# Patient Record
Sex: MALE | Race: WHITE | NOT HISPANIC OR LATINO
[De-identification: names, ages, dates, MRNs, and addresses within clinical notes are randomized per-mention and may not be internally consistent; named-entity substitution may affect disease eponyms.]

---

## 2016-07-22 RX ORDER — POTASSIUM CHLORIDE 20 MEQ
1 PACKET (EA) ORAL
Qty: 0 | Refills: 0 | COMMUNITY
Start: 2016-07-22

## 2016-07-22 RX ORDER — ASPIRIN/CALCIUM CARB/MAGNESIUM 324 MG
1 TABLET ORAL
Qty: 0 | Refills: 0 | COMMUNITY
Start: 2016-07-22

## 2016-07-22 RX ORDER — LOSARTAN POTASSIUM 100 MG/1
1 TABLET, FILM COATED ORAL
Qty: 0 | Refills: 0 | COMMUNITY
Start: 2016-07-22

## 2017-01-10 ENCOUNTER — OTHER (OUTPATIENT)
Age: 82
End: 2017-01-10

## 2017-02-13 ENCOUNTER — FORM ENCOUNTER (OUTPATIENT)
Age: 82
End: 2017-02-13

## 2017-02-14 ENCOUNTER — OUTPATIENT (OUTPATIENT)
Dept: OUTPATIENT SERVICES | Facility: HOSPITAL | Age: 82
LOS: 1 days | End: 2017-02-14
Payer: MEDICARE

## 2017-02-14 ENCOUNTER — APPOINTMENT (OUTPATIENT)
Dept: CT IMAGING | Facility: CLINIC | Age: 82
End: 2017-02-14

## 2017-02-14 DIAGNOSIS — K08.409 PARTIAL LOSS OF TEETH, UNSPECIFIED CAUSE, UNSPECIFIED CLASS: Chronic | ICD-10-CM

## 2017-02-14 DIAGNOSIS — Z98.89 OTHER SPECIFIED POSTPROCEDURAL STATES: Chronic | ICD-10-CM

## 2017-02-14 DIAGNOSIS — Z95.5 PRESENCE OF CORONARY ANGIOPLASTY IMPLANT AND GRAFT: Chronic | ICD-10-CM

## 2017-02-14 DIAGNOSIS — Z00.8 ENCOUNTER FOR OTHER GENERAL EXAMINATION: ICD-10-CM

## 2017-02-14 DIAGNOSIS — C64.9 MALIGNANT NEOPLASM OF UNSPECIFIED KIDNEY, EXCEPT RENAL PELVIS: ICD-10-CM

## 2017-02-14 PROCEDURE — 74170 CT ABD WO CNTRST FLWD CNTRST: CPT

## 2017-02-17 ENCOUNTER — APPOINTMENT (OUTPATIENT)
Dept: UROLOGY | Facility: CLINIC | Age: 82
End: 2017-02-17

## 2017-02-17 VITALS
BODY MASS INDEX: 24.13 KG/M2 | WEIGHT: 188 LBS | SYSTOLIC BLOOD PRESSURE: 100 MMHG | HEIGHT: 74 IN | HEART RATE: 81 BPM | DIASTOLIC BLOOD PRESSURE: 58 MMHG

## 2017-03-06 ENCOUNTER — OUTPATIENT (OUTPATIENT)
Dept: OUTPATIENT SERVICES | Facility: HOSPITAL | Age: 82
LOS: 1 days | Discharge: HOME | End: 2017-03-06
Payer: MEDICARE

## 2017-03-06 DIAGNOSIS — C64.9 MALIGNANT NEOPLASM OF UNSPECIFIED KIDNEY, EXCEPT RENAL PELVIS: ICD-10-CM

## 2017-03-06 DIAGNOSIS — K08.409 PARTIAL LOSS OF TEETH, UNSPECIFIED CAUSE, UNSPECIFIED CLASS: Chronic | ICD-10-CM

## 2017-03-06 DIAGNOSIS — Z98.89 OTHER SPECIFIED POSTPROCEDURAL STATES: Chronic | ICD-10-CM

## 2017-03-06 DIAGNOSIS — Z95.5 PRESENCE OF CORONARY ANGIOPLASTY IMPLANT AND GRAFT: Chronic | ICD-10-CM

## 2017-03-08 ENCOUNTER — APPOINTMENT (OUTPATIENT)
Dept: HEMATOLOGY ONCOLOGY | Facility: CLINIC | Age: 82
End: 2017-03-08

## 2017-03-08 VITALS
HEIGHT: 73.23 IN | RESPIRATION RATE: 16 BRPM | OXYGEN SATURATION: 99 % | DIASTOLIC BLOOD PRESSURE: 70 MMHG | SYSTOLIC BLOOD PRESSURE: 112 MMHG | TEMPERATURE: 96.8 F | BODY MASS INDEX: 23.99 KG/M2 | HEART RATE: 80 BPM | WEIGHT: 182.98 LBS

## 2017-03-08 DIAGNOSIS — Z80.51 FAMILY HISTORY OF MALIGNANT NEOPLASM OF KIDNEY: ICD-10-CM

## 2017-03-08 DIAGNOSIS — Z85.820 PERSONAL HISTORY OF MALIGNANT MELANOMA OF SKIN: ICD-10-CM

## 2017-03-08 DIAGNOSIS — Z82.0 FAMILY HISTORY OF EPILEPSY AND OTHER DISEASES OF THE NERVOUS SYSTEM: ICD-10-CM

## 2017-03-08 DIAGNOSIS — Z85.118 PERSONAL HISTORY OF OTHER MALIGNANT NEOPLASM OF BRONCHUS AND LUNG: ICD-10-CM

## 2017-03-08 DIAGNOSIS — Z86.39 PERSONAL HISTORY OF OTHER ENDOCRINE, NUTRITIONAL AND METABOLIC DISEASE: ICD-10-CM

## 2017-03-08 DIAGNOSIS — Z87.891 PERSONAL HISTORY OF NICOTINE DEPENDENCE: ICD-10-CM

## 2017-03-08 DIAGNOSIS — H35.30 UNSPECIFIED MACULAR DEGENERATION: ICD-10-CM

## 2017-03-08 DIAGNOSIS — Z80.3 FAMILY HISTORY OF MALIGNANT NEOPLASM OF BREAST: ICD-10-CM

## 2017-03-08 DIAGNOSIS — Z80.8 FAMILY HISTORY OF MALIGNANT NEOPLASM OF OTHER ORGANS OR SYSTEMS: ICD-10-CM

## 2017-03-09 DIAGNOSIS — C34.90 MALIGNANT NEOPLASM OF UNSPECIFIED PART OF UNSPECIFIED BRONCHUS OR LUNG: ICD-10-CM

## 2017-03-09 DIAGNOSIS — C64.2 MALIGNANT NEOPLASM OF LEFT KIDNEY, EXCEPT RENAL PELVIS: ICD-10-CM

## 2017-03-19 ENCOUNTER — RESULT REVIEW (OUTPATIENT)
Age: 82
End: 2017-03-19

## 2017-03-20 PROCEDURE — 88321 CONSLTJ&REPRT SLD PREP ELSWR: CPT

## 2017-03-28 ENCOUNTER — FORM ENCOUNTER (OUTPATIENT)
Age: 82
End: 2017-03-28

## 2017-03-29 ENCOUNTER — OUTPATIENT (OUTPATIENT)
Dept: OUTPATIENT SERVICES | Facility: HOSPITAL | Age: 82
LOS: 1 days | End: 2017-03-29
Payer: MEDICARE

## 2017-03-29 ENCOUNTER — APPOINTMENT (OUTPATIENT)
Dept: RADIOLOGY | Facility: CLINIC | Age: 82
End: 2017-03-29

## 2017-03-29 DIAGNOSIS — Z98.89 OTHER SPECIFIED POSTPROCEDURAL STATES: Chronic | ICD-10-CM

## 2017-03-29 DIAGNOSIS — J90 PLEURAL EFFUSION, NOT ELSEWHERE CLASSIFIED: ICD-10-CM

## 2017-03-29 DIAGNOSIS — Z95.5 PRESENCE OF CORONARY ANGIOPLASTY IMPLANT AND GRAFT: Chronic | ICD-10-CM

## 2017-03-29 DIAGNOSIS — K08.409 PARTIAL LOSS OF TEETH, UNSPECIFIED CAUSE, UNSPECIFIED CLASS: Chronic | ICD-10-CM

## 2017-03-29 PROCEDURE — 71046 X-RAY EXAM CHEST 2 VIEWS: CPT

## 2017-03-29 PROCEDURE — 71020: CPT | Mod: 26

## 2017-04-03 ENCOUNTER — APPOINTMENT (OUTPATIENT)
Dept: PULMONOLOGY | Facility: CLINIC | Age: 82
End: 2017-04-03

## 2017-04-03 VITALS
WEIGHT: 185 LBS | SYSTOLIC BLOOD PRESSURE: 130 MMHG | DIASTOLIC BLOOD PRESSURE: 64 MMHG | HEART RATE: 78 BPM | BODY MASS INDEX: 24.26 KG/M2 | OXYGEN SATURATION: 97 %

## 2017-04-03 DIAGNOSIS — C64.9 MALIGNANT NEOPLASM OF UNSPECIFIED KIDNEY, EXCEPT RENAL PELVIS: ICD-10-CM

## 2017-04-03 DIAGNOSIS — Z85.528 PERSONAL HISTORY OF OTHER MALIGNANT NEOPLASM OF KIDNEY: ICD-10-CM

## 2017-04-03 DIAGNOSIS — J98.4 OTHER DISORDERS OF LUNG: ICD-10-CM

## 2017-04-06 ENCOUNTER — APPOINTMENT (OUTPATIENT)
Dept: DERMATOLOGY | Facility: CLINIC | Age: 82
End: 2017-04-06

## 2017-04-13 ENCOUNTER — APPOINTMENT (OUTPATIENT)
Dept: THORACIC SURGERY | Facility: CLINIC | Age: 82
End: 2017-04-13

## 2017-04-13 ENCOUNTER — OUTPATIENT (OUTPATIENT)
Dept: OUTPATIENT SERVICES | Facility: HOSPITAL | Age: 82
LOS: 1 days | End: 2017-04-13
Payer: MEDICARE

## 2017-04-13 VITALS
RESPIRATION RATE: 16 BRPM | SYSTOLIC BLOOD PRESSURE: 119 MMHG | HEIGHT: 73 IN | DIASTOLIC BLOOD PRESSURE: 74 MMHG | HEART RATE: 96 BPM | OXYGEN SATURATION: 98 % | BODY MASS INDEX: 24.52 KG/M2 | WEIGHT: 185 LBS

## 2017-04-13 DIAGNOSIS — K08.409 PARTIAL LOSS OF TEETH, UNSPECIFIED CAUSE, UNSPECIFIED CLASS: Chronic | ICD-10-CM

## 2017-04-13 DIAGNOSIS — Z95.5 PRESENCE OF CORONARY ANGIOPLASTY IMPLANT AND GRAFT: Chronic | ICD-10-CM

## 2017-04-13 DIAGNOSIS — Z98.89 OTHER SPECIFIED POSTPROCEDURAL STATES: Chronic | ICD-10-CM

## 2017-04-13 DIAGNOSIS — J90 PLEURAL EFFUSION, NOT ELSEWHERE CLASSIFIED: ICD-10-CM

## 2017-04-13 PROCEDURE — 71046 X-RAY EXAM CHEST 2 VIEWS: CPT

## 2017-04-13 PROCEDURE — 71020: CPT | Mod: 26

## 2017-04-13 RX ORDER — ALBUTEROL SULFATE 90 UG/1
108 (90 BASE) AEROSOL, METERED RESPIRATORY (INHALATION)
Refills: 0 | Status: ACTIVE | COMMUNITY

## 2017-04-25 ENCOUNTER — OUTPATIENT (OUTPATIENT)
Dept: OUTPATIENT SERVICES | Facility: HOSPITAL | Age: 82
LOS: 1 days | End: 2017-04-25
Payer: MEDICARE

## 2017-04-25 VITALS
SYSTOLIC BLOOD PRESSURE: 119 MMHG | HEIGHT: 74 IN | DIASTOLIC BLOOD PRESSURE: 65 MMHG | HEART RATE: 72 BPM | TEMPERATURE: 98 F | RESPIRATION RATE: 18 BRPM | WEIGHT: 178.57 LBS

## 2017-04-25 DIAGNOSIS — Z98.89 OTHER SPECIFIED POSTPROCEDURAL STATES: Chronic | ICD-10-CM

## 2017-04-25 DIAGNOSIS — Z01.818 ENCOUNTER FOR OTHER PREPROCEDURAL EXAMINATION: ICD-10-CM

## 2017-04-25 DIAGNOSIS — J91.8 PLEURAL EFFUSION IN OTHER CONDITIONS CLASSIFIED ELSEWHERE: ICD-10-CM

## 2017-04-25 DIAGNOSIS — Z95.5 PRESENCE OF CORONARY ANGIOPLASTY IMPLANT AND GRAFT: Chronic | ICD-10-CM

## 2017-04-25 DIAGNOSIS — Z98.890 OTHER SPECIFIED POSTPROCEDURAL STATES: Chronic | ICD-10-CM

## 2017-04-25 DIAGNOSIS — K08.409 PARTIAL LOSS OF TEETH, UNSPECIFIED CAUSE, UNSPECIFIED CLASS: Chronic | ICD-10-CM

## 2017-04-25 DIAGNOSIS — Z98.41 CATARACT EXTRACTION STATUS, RIGHT EYE: Chronic | ICD-10-CM

## 2017-04-25 LAB
ANION GAP SERPL CALC-SCNC: 12 MMOL/L — SIGNIFICANT CHANGE UP (ref 5–17)
APTT BLD: 28.1 SEC — SIGNIFICANT CHANGE UP (ref 27.5–37.4)
BASOPHILS # BLD AUTO: 0 K/UL — SIGNIFICANT CHANGE UP (ref 0–0.2)
BASOPHILS NFR BLD AUTO: 0.4 % — SIGNIFICANT CHANGE UP (ref 0–2)
BUN SERPL-MCNC: 26 MG/DL — HIGH (ref 8–20)
CALCIUM SERPL-MCNC: 9.6 MG/DL — SIGNIFICANT CHANGE UP (ref 8.6–10.2)
CHLORIDE SERPL-SCNC: 99 MMOL/L — SIGNIFICANT CHANGE UP (ref 98–107)
CO2 SERPL-SCNC: 26 MMOL/L — SIGNIFICANT CHANGE UP (ref 22–29)
CREAT SERPL-MCNC: 1.03 MG/DL — SIGNIFICANT CHANGE UP (ref 0.5–1.3)
EOSINOPHIL # BLD AUTO: 0.2 K/UL — SIGNIFICANT CHANGE UP (ref 0–0.5)
EOSINOPHIL NFR BLD AUTO: 2.1 % — SIGNIFICANT CHANGE UP (ref 0–5)
GLUCOSE SERPL-MCNC: 147 MG/DL — HIGH (ref 70–115)
HCT VFR BLD CALC: 35.7 % — LOW (ref 42–52)
HGB BLD-MCNC: 11.5 G/DL — LOW (ref 14–18)
INR BLD: 1.04 RATIO — SIGNIFICANT CHANGE UP (ref 0.88–1.16)
LYMPHOCYTES # BLD AUTO: 0.3 K/UL — LOW (ref 1–4.8)
LYMPHOCYTES # BLD AUTO: 4.1 % — LOW (ref 20–55)
MCHC RBC-ENTMCNC: 30.3 PG — SIGNIFICANT CHANGE UP (ref 27–31)
MCHC RBC-ENTMCNC: 32.2 G/DL — SIGNIFICANT CHANGE UP (ref 32–36)
MCV RBC AUTO: 93.9 FL — SIGNIFICANT CHANGE UP (ref 80–94)
MONOCYTES # BLD AUTO: 0.8 K/UL — SIGNIFICANT CHANGE UP (ref 0–0.8)
MONOCYTES NFR BLD AUTO: 10.8 % — HIGH (ref 3–10)
NEUTROPHILS # BLD AUTO: 6.4 K/UL — SIGNIFICANT CHANGE UP (ref 1.8–8)
NEUTROPHILS NFR BLD AUTO: 82.3 % — HIGH (ref 37–73)
PLATELET # BLD AUTO: 204 K/UL — SIGNIFICANT CHANGE UP (ref 150–400)
POTASSIUM SERPL-MCNC: 4.3 MMOL/L — SIGNIFICANT CHANGE UP (ref 3.5–5.3)
POTASSIUM SERPL-SCNC: 4.3 MMOL/L — SIGNIFICANT CHANGE UP (ref 3.5–5.3)
PROTHROM AB SERPL-ACNC: 11.4 SEC — SIGNIFICANT CHANGE UP (ref 9.8–12.7)
RBC # BLD: 3.8 M/UL — LOW (ref 4.6–6.2)
RBC # FLD: 14.5 % — SIGNIFICANT CHANGE UP (ref 11–15.6)
SODIUM SERPL-SCNC: 137 MMOL/L — SIGNIFICANT CHANGE UP (ref 135–145)
WBC # BLD: 7.8 K/UL — SIGNIFICANT CHANGE UP (ref 4.8–10.8)
WBC # FLD AUTO: 7.8 K/UL — SIGNIFICANT CHANGE UP (ref 4.8–10.8)

## 2017-04-25 PROCEDURE — G0463: CPT

## 2017-04-25 PROCEDURE — 85027 COMPLETE CBC AUTOMATED: CPT

## 2017-04-25 PROCEDURE — 85610 PROTHROMBIN TIME: CPT

## 2017-04-25 PROCEDURE — 80048 BASIC METABOLIC PNL TOTAL CA: CPT

## 2017-04-25 PROCEDURE — 85730 THROMBOPLASTIN TIME PARTIAL: CPT

## 2017-04-25 RX ORDER — SODIUM CHLORIDE 9 MG/ML
3 INJECTION INTRAMUSCULAR; INTRAVENOUS; SUBCUTANEOUS ONCE
Qty: 0 | Refills: 0 | Status: DISCONTINUED | OUTPATIENT
Start: 2017-04-28 | End: 2017-05-13

## 2017-04-25 NOTE — H&P PST ADULT - FAMILY HISTORY
Mother  Still living? Unknown  Family history of kidney cancer, Age at diagnosis: Age Unknown     Sibling  Still living? Unknown  Family history of kidney cancer, Age at diagnosis: Age Unknown     Father  Still living? Unknown  Family history of coronary artery disease, Age at diagnosis: Age Unknown

## 2017-04-25 NOTE — H&P PST ADULT - PSH
H/O shoulder surgery    H/O tooth extraction    History of bilateral cataract extraction    History of knee surgery    History of thoracic surgery  VATS  Presence of stent in coronary artery  x 17  S/P skin cancer resection

## 2017-04-25 NOTE — ASU PATIENT PROFILE, ADULT - ABILITY TO HEAR (WITH HEARING AID OR HEARING APPLIANCE IF NORMALLY USED):
hearing aids both ears/Mildly to Moderately Impaired: difficulty hearing in some environments or speaker may need to increase volume or speak distinctly

## 2017-04-25 NOTE — H&P PST ADULT - PMH
BPH (benign prostatic hyperplasia)    COPD (chronic obstructive pulmonary disease)    DM (diabetes mellitus)    DVT (deep venous thrombosis)    HLD (hyperlipidemia)    HTN (hypertension)    Kidney carcinoma    Lung cancer    Macular degeneration    Melanoma    Multiple falls    Parotid tumor  Right  Pleural effusion    Squamous cell skin cancer    TIA (transient ischemic attack) Atrial fibrillation    BPH (benign prostatic hyperplasia)    COPD (chronic obstructive pulmonary disease)    DM (diabetes mellitus)    DVT (deep venous thrombosis)    HLD (hyperlipidemia)    HTN (hypertension)    Kidney carcinoma    Lung cancer    Macular degeneration    Melanoma    Multiple falls    Parotid tumor  Right  Pleural effusion    Squamous cell skin cancer    TIA (transient ischemic attack)

## 2017-04-25 NOTE — H&P PST ADULT - HISTORY OF PRESENT ILLNESS
85M with history of lung cancer, had prior Thoracentesis March 2016. Saw Dr. Mcelroy for routine follow up of chronic SOB April 3, 2017. Chest xray was concerning and a consult with Dr. Freitas was obtained. Currently scheduled for thoracenteisis. 85M with history of lung cancer, had prior Thoracentesis March 2016. Saw Dr. Mcelroy for routine follow up of chronic SOB April 3, 2017. Chest xray was concerning and a consult with Dr. Freitas was obtained. Currently scheduled for thoracentesis. Patient reports breathing comfortable at rest but with dyspnea on minimal exertion.

## 2017-04-25 NOTE — ASU PATIENT PROFILE, ADULT - PMH
BPH (benign prostatic hyperplasia)    COPD (chronic obstructive pulmonary disease)    DM (diabetes mellitus)    DVT (deep venous thrombosis)    HLD (hyperlipidemia)    HTN (hypertension)    Kidney carcinoma    Lung cancer    Macular degeneration    Melanoma    Multiple falls    Parotid tumor  Right  Pleural effusion    Squamous cell skin cancer    TIA (transient ischemic attack)

## 2017-04-25 NOTE — H&P PST ADULT - NSANTHOSAYNRD_GEN_A_CORE
No. DAYO screening performed.  STOP BANG Legend: 0-2 = LOW Risk; 3-4 = INTERMEDIATE Risk; 5-8 = HIGH Risk

## 2017-04-25 NOTE — H&P PST ADULT - ASSESSMENT
85M PMH HTN, CAD with stents, CHF, COPD, Pleural Effusion, Lung Cancer, Kidney Carcinoma, Melanoma, DM, Hyperlipidemia, DVT, TIA, BPH and Macular Degeneration with Left Pleural Effusion for Left Thoracentesis.

## 2017-04-26 DIAGNOSIS — Z01.818 ENCOUNTER FOR OTHER PREPROCEDURAL EXAMINATION: ICD-10-CM

## 2017-04-26 DIAGNOSIS — J91.8 PLEURAL EFFUSION IN OTHER CONDITIONS CLASSIFIED ELSEWHERE: ICD-10-CM

## 2017-04-28 ENCOUNTER — RESULT REVIEW (OUTPATIENT)
Age: 82
End: 2017-04-28

## 2017-04-28 ENCOUNTER — OUTPATIENT (OUTPATIENT)
Dept: OUTPATIENT SERVICES | Facility: HOSPITAL | Age: 82
LOS: 1 days | End: 2017-04-28
Payer: MEDICARE

## 2017-04-28 VITALS
WEIGHT: 179.9 LBS | HEART RATE: 84 BPM | DIASTOLIC BLOOD PRESSURE: 53 MMHG | SYSTOLIC BLOOD PRESSURE: 126 MMHG | OXYGEN SATURATION: 100 % | HEIGHT: 74 IN | RESPIRATION RATE: 16 BRPM | TEMPERATURE: 97 F

## 2017-04-28 VITALS
DIASTOLIC BLOOD PRESSURE: 58 MMHG | RESPIRATION RATE: 15 BRPM | HEART RATE: 71 BPM | SYSTOLIC BLOOD PRESSURE: 119 MMHG | OXYGEN SATURATION: 100 % | TEMPERATURE: 97 F

## 2017-04-28 DIAGNOSIS — Z98.890 OTHER SPECIFIED POSTPROCEDURAL STATES: Chronic | ICD-10-CM

## 2017-04-28 DIAGNOSIS — K08.409 PARTIAL LOSS OF TEETH, UNSPECIFIED CAUSE, UNSPECIFIED CLASS: Chronic | ICD-10-CM

## 2017-04-28 DIAGNOSIS — Z98.89 OTHER SPECIFIED POSTPROCEDURAL STATES: Chronic | ICD-10-CM

## 2017-04-28 DIAGNOSIS — J91.8 PLEURAL EFFUSION IN OTHER CONDITIONS CLASSIFIED ELSEWHERE: ICD-10-CM

## 2017-04-28 DIAGNOSIS — Z98.41 CATARACT EXTRACTION STATUS, RIGHT EYE: Chronic | ICD-10-CM

## 2017-04-28 DIAGNOSIS — Z95.5 PRESENCE OF CORONARY ANGIOPLASTY IMPLANT AND GRAFT: Chronic | ICD-10-CM

## 2017-04-28 LAB
ALBUMIN FLD-MCNC: 1.4 G/DL — SIGNIFICANT CHANGE UP
B PERT IGG+IGM PNL SER: ABNORMAL
COLOR FLD: SIGNIFICANT CHANGE UP
EOSINOPHIL # FLD: 1 % — SIGNIFICANT CHANGE UP
FLUID INTAKE SUBSTANCE CLASS: SIGNIFICANT CHANGE UP
FLUID SEGMENTED GRANULOCYTES: 8 % — SIGNIFICANT CHANGE UP
GLUCOSE FLD-MCNC: 103 MG/DL — SIGNIFICANT CHANGE UP
LDH SERPL L TO P-CCNC: 90 U/L — SIGNIFICANT CHANGE UP
LYMPHOCYTES # FLD: 77 % — SIGNIFICANT CHANGE UP
MESOTHL CELL # FLD: 7 % — SIGNIFICANT CHANGE UP
MONOS+MACROS # FLD: 7 % — SIGNIFICANT CHANGE UP
NIGHT BLUE STAIN TISS: SIGNIFICANT CHANGE UP
PH FLD: 8 — SIGNIFICANT CHANGE UP
PROT FLD-MCNC: 2.3 G/DL — SIGNIFICANT CHANGE UP
RCV VOL RI: HIGH /UL (ref 0–5)
SPECIMEN SOURCE FLD: SIGNIFICANT CHANGE UP
SPECIMEN SOURCE: SIGNIFICANT CHANGE UP
TOTAL NUCLEATED CELL COUNT, BODY FLUID: 728 /UL — HIGH (ref 0–5)
TUBE TYPE: SIGNIFICANT CHANGE UP

## 2017-04-28 PROCEDURE — 88112 CYTOPATH CELL ENHANCE TECH: CPT | Mod: 26

## 2017-04-28 PROCEDURE — 71010: CPT | Mod: 26

## 2017-04-28 PROCEDURE — 88305 TISSUE EXAM BY PATHOLOGIST: CPT | Mod: 26

## 2017-04-28 NOTE — PROGRESS NOTE ADULT - SUBJECTIVE AND OBJECTIVE BOX
POD 0 thoracentesis Left    ALLERGIES:  No Known Allergies      SUBJECTIVE:  Patient is a 85y Male   Seen and examined at bedside.  comfortable, had follow up CXR due small PTX,s/p thoracentesis.  No apparent distress, no respiratory distress  Nontoxic appearing.      PHYSICAL EXAM:    VITALS:  T(C): 36.2, Max: 36.2 (04-28 @ 07:06)  HR: 71 (66 - 85)  BP: 119/58 (110/56 - 126/68)  RR: 15 (14 - 18)  SpO2: 100% (99% - 100%)  Wt(kg): --    PE:  chest:  good air exchange      RADIOLOGY:  follow up CXR - no PTX noted     IMPRESSION:   s/p thoracentesis     PLAN:  [facility]  Discussed with Dr Bender

## 2017-05-03 LAB
CULTURE RESULTS: SIGNIFICANT CHANGE UP
SPECIMEN SOURCE: SIGNIFICANT CHANGE UP

## 2017-05-04 LAB — NON-GYN CYTOLOGY SPEC: SIGNIFICANT CHANGE UP

## 2017-05-08 ENCOUNTER — OUTPATIENT (OUTPATIENT)
Dept: OUTPATIENT SERVICES | Facility: HOSPITAL | Age: 82
LOS: 1 days | End: 2017-05-08
Payer: COMMERCIAL

## 2017-05-08 ENCOUNTER — APPOINTMENT (OUTPATIENT)
Dept: THORACIC SURGERY | Facility: CLINIC | Age: 82
End: 2017-05-08

## 2017-05-08 VITALS
SYSTOLIC BLOOD PRESSURE: 100 MMHG | BODY MASS INDEX: 24.52 KG/M2 | HEIGHT: 73 IN | HEART RATE: 71 BPM | RESPIRATION RATE: 16 BRPM | OXYGEN SATURATION: 99 % | DIASTOLIC BLOOD PRESSURE: 58 MMHG | WEIGHT: 185 LBS

## 2017-05-08 DIAGNOSIS — Z98.89 OTHER SPECIFIED POSTPROCEDURAL STATES: Chronic | ICD-10-CM

## 2017-05-08 DIAGNOSIS — K08.409 PARTIAL LOSS OF TEETH, UNSPECIFIED CAUSE, UNSPECIFIED CLASS: Chronic | ICD-10-CM

## 2017-05-08 DIAGNOSIS — Z98.890 OTHER SPECIFIED POSTPROCEDURAL STATES: Chronic | ICD-10-CM

## 2017-05-08 DIAGNOSIS — Z98.41 CATARACT EXTRACTION STATUS, RIGHT EYE: Chronic | ICD-10-CM

## 2017-05-08 DIAGNOSIS — Z95.5 PRESENCE OF CORONARY ANGIOPLASTY IMPLANT AND GRAFT: Chronic | ICD-10-CM

## 2017-05-08 DIAGNOSIS — J90 PLEURAL EFFUSION, NOT ELSEWHERE CLASSIFIED: ICD-10-CM

## 2017-05-08 PROCEDURE — C1769: CPT

## 2017-05-08 PROCEDURE — 87206 SMEAR FLUORESCENT/ACID STAI: CPT

## 2017-05-08 PROCEDURE — C1894: CPT

## 2017-05-08 PROCEDURE — 88112 CYTOPATH CELL ENHANCE TECH: CPT

## 2017-05-08 PROCEDURE — 76942 ECHO GUIDE FOR BIOPSY: CPT

## 2017-05-08 PROCEDURE — 83986 ASSAY PH BODY FLUID NOS: CPT

## 2017-05-08 PROCEDURE — 87205 SMEAR GRAM STAIN: CPT

## 2017-05-08 PROCEDURE — 87070 CULTURE OTHR SPECIMN AEROBIC: CPT

## 2017-05-08 PROCEDURE — 88305 TISSUE EXAM BY PATHOLOGIST: CPT

## 2017-05-08 PROCEDURE — 87102 FUNGUS ISOLATION CULTURE: CPT

## 2017-05-08 PROCEDURE — 87075 CULTR BACTERIA EXCEPT BLOOD: CPT

## 2017-05-08 PROCEDURE — 71020: CPT | Mod: 26

## 2017-05-08 PROCEDURE — 71046 X-RAY EXAM CHEST 2 VIEWS: CPT

## 2017-05-08 PROCEDURE — 71045 X-RAY EXAM CHEST 1 VIEW: CPT

## 2017-05-08 PROCEDURE — 89051 BODY FLUID CELL COUNT: CPT

## 2017-05-08 PROCEDURE — 87015 SPECIMEN INFECT AGNT CONCNTJ: CPT

## 2017-05-08 PROCEDURE — 32555 ASPIRATE PLEURA W/ IMAGING: CPT | Mod: LT

## 2017-05-08 PROCEDURE — 32555 ASPIRATE PLEURA W/ IMAGING: CPT

## 2017-05-08 PROCEDURE — 84157 ASSAY OF PROTEIN OTHER: CPT

## 2017-05-08 PROCEDURE — 87116 MYCOBACTERIA CULTURE: CPT

## 2017-05-08 PROCEDURE — 83615 LACTATE (LD) (LDH) ENZYME: CPT

## 2017-05-08 PROCEDURE — 82945 GLUCOSE OTHER FLUID: CPT

## 2017-05-08 PROCEDURE — 82042 OTHER SOURCE ALBUMIN QUAN EA: CPT

## 2017-05-11 ENCOUNTER — NON-APPOINTMENT (OUTPATIENT)
Age: 82
End: 2017-05-11

## 2017-05-11 ENCOUNTER — APPOINTMENT (OUTPATIENT)
Dept: CARDIOLOGY | Facility: CLINIC | Age: 82
End: 2017-05-11

## 2017-05-11 VITALS
HEIGHT: 73 IN | BODY MASS INDEX: 24.52 KG/M2 | SYSTOLIC BLOOD PRESSURE: 128 MMHG | DIASTOLIC BLOOD PRESSURE: 73 MMHG | OXYGEN SATURATION: 94 % | WEIGHT: 185 LBS | HEART RATE: 83 BPM

## 2017-05-11 VITALS — SYSTOLIC BLOOD PRESSURE: 125 MMHG | DIASTOLIC BLOOD PRESSURE: 73 MMHG

## 2017-05-11 DIAGNOSIS — I49.9 CARDIAC ARRHYTHMIA, UNSPECIFIED: ICD-10-CM

## 2017-05-11 DIAGNOSIS — Z86.73 PERSONAL HISTORY OF TRANSIENT ISCHEMIC ATTACK (TIA), AND CEREBRAL INFARCTION W/OUT RESIDUAL DEFICITS: ICD-10-CM

## 2017-05-11 DIAGNOSIS — E78.00 PURE HYPERCHOLESTEROLEMIA, UNSPECIFIED: ICD-10-CM

## 2017-05-11 DIAGNOSIS — Z87.448 PERSONAL HISTORY OF OTHER DISEASES OF URINARY SYSTEM: ICD-10-CM

## 2017-05-11 DIAGNOSIS — Z82.49 FAMILY HISTORY OF ISCHEMIC HEART DISEASE AND OTHER DISEASES OF THE CIRCULATORY SYSTEM: ICD-10-CM

## 2017-05-11 RX ORDER — NITROGLYCERIN 0.4 MG/1
0.4 TABLET SUBLINGUAL
Refills: 0 | Status: DISCONTINUED | COMMUNITY
End: 2017-05-11

## 2017-05-19 LAB
CULTURE RESULTS: SIGNIFICANT CHANGE UP
SPECIMEN SOURCE: SIGNIFICANT CHANGE UP

## 2017-05-26 ENCOUNTER — APPOINTMENT (OUTPATIENT)
Dept: CARDIOLOGY | Facility: CLINIC | Age: 82
End: 2017-05-26

## 2017-05-26 VITALS
HEART RATE: 78 BPM | OXYGEN SATURATION: 99 % | SYSTOLIC BLOOD PRESSURE: 115 MMHG | WEIGHT: 175 LBS | HEIGHT: 74 IN | BODY MASS INDEX: 22.46 KG/M2 | DIASTOLIC BLOOD PRESSURE: 68 MMHG

## 2017-05-26 DIAGNOSIS — R94.39 ABNORMAL RESULT OF OTHER CARDIOVASCULAR FUNCTION STUDY: ICD-10-CM

## 2017-05-26 DIAGNOSIS — Z86.79 PERSONAL HISTORY OF OTHER DISEASES OF THE CIRCULATORY SYSTEM: ICD-10-CM

## 2017-06-07 ENCOUNTER — OUTPATIENT (OUTPATIENT)
Dept: OUTPATIENT SERVICES | Facility: HOSPITAL | Age: 82
LOS: 1 days | Discharge: HOME | End: 2017-06-07

## 2017-06-07 DIAGNOSIS — Z98.89 OTHER SPECIFIED POSTPROCEDURAL STATES: Chronic | ICD-10-CM

## 2017-06-07 DIAGNOSIS — C64.2 MALIGNANT NEOPLASM OF LEFT KIDNEY, EXCEPT RENAL PELVIS: ICD-10-CM

## 2017-06-07 DIAGNOSIS — Z98.41 CATARACT EXTRACTION STATUS, RIGHT EYE: Chronic | ICD-10-CM

## 2017-06-07 DIAGNOSIS — C34.90 MALIGNANT NEOPLASM OF UNSPECIFIED PART OF UNSPECIFIED BRONCHUS OR LUNG: ICD-10-CM

## 2017-06-07 DIAGNOSIS — Z98.890 OTHER SPECIFIED POSTPROCEDURAL STATES: Chronic | ICD-10-CM

## 2017-06-07 DIAGNOSIS — K08.409 PARTIAL LOSS OF TEETH, UNSPECIFIED CAUSE, UNSPECIFIED CLASS: Chronic | ICD-10-CM

## 2017-06-07 DIAGNOSIS — Z95.5 PRESENCE OF CORONARY ANGIOPLASTY IMPLANT AND GRAFT: Chronic | ICD-10-CM

## 2017-06-09 ENCOUNTER — APPOINTMENT (OUTPATIENT)
Dept: HEMATOLOGY ONCOLOGY | Facility: CLINIC | Age: 82
End: 2017-06-09

## 2017-06-09 VITALS
DIASTOLIC BLOOD PRESSURE: 68 MMHG | WEIGHT: 180.78 LBS | HEART RATE: 78 BPM | OXYGEN SATURATION: 97 % | TEMPERATURE: 97.1 F | BODY MASS INDEX: 23.21 KG/M2 | SYSTOLIC BLOOD PRESSURE: 110 MMHG | RESPIRATION RATE: 16 BRPM

## 2017-06-09 DIAGNOSIS — R54 AGE-RELATED PHYSICAL DEBILITY: ICD-10-CM

## 2017-06-10 LAB
CULTURE RESULTS: SIGNIFICANT CHANGE UP
SPECIMEN SOURCE: SIGNIFICANT CHANGE UP

## 2017-07-06 ENCOUNTER — APPOINTMENT (OUTPATIENT)
Dept: CARDIOLOGY | Facility: CLINIC | Age: 82
End: 2017-07-06

## 2017-07-06 ENCOUNTER — NON-APPOINTMENT (OUTPATIENT)
Age: 82
End: 2017-07-06

## 2017-07-06 VITALS — SYSTOLIC BLOOD PRESSURE: 119 MMHG | DIASTOLIC BLOOD PRESSURE: 68 MMHG | HEART RATE: 92 BPM | OXYGEN SATURATION: 100 %

## 2017-07-06 RX ORDER — METOPROLOL SUCCINATE 25 MG/1
25 TABLET, EXTENDED RELEASE ORAL
Qty: 270 | Refills: 1 | Status: ACTIVE | COMMUNITY
Start: 1900-01-01 | End: 1900-01-01

## 2017-07-06 RX ORDER — METOPROLOL TARTRATE 50 MG/1
50 TABLET ORAL TWICE DAILY
Qty: 60 | Refills: 3 | Status: DISCONTINUED | COMMUNITY
End: 2017-07-06

## 2017-07-06 RX ORDER — APIXABAN 5 MG/1
5 TABLET, FILM COATED ORAL
Qty: 30 | Refills: 3 | Status: DISCONTINUED | COMMUNITY
Start: 2017-05-26 | End: 2017-07-06

## 2017-07-06 RX ORDER — PANTOPRAZOLE 40 MG/1
40 TABLET, DELAYED RELEASE ORAL
Refills: 0 | Status: ACTIVE | COMMUNITY

## 2017-07-07 ENCOUNTER — APPOINTMENT (OUTPATIENT)
Dept: PULMONOLOGY | Facility: CLINIC | Age: 82
End: 2017-07-07

## 2017-07-07 VITALS
WEIGHT: 180 LBS | HEART RATE: 84 BPM | DIASTOLIC BLOOD PRESSURE: 60 MMHG | SYSTOLIC BLOOD PRESSURE: 130 MMHG | OXYGEN SATURATION: 97 % | HEIGHT: 74 IN | BODY MASS INDEX: 23.1 KG/M2

## 2017-07-07 DIAGNOSIS — Z78.9 OTHER SPECIFIED HEALTH STATUS: ICD-10-CM

## 2017-07-07 DIAGNOSIS — I82.409 ACUTE EMBOLISM AND THROMBOSIS OF UNSPECIFIED DEEP VEINS OF UNSPECIFIED LOWER EXTREMITY: ICD-10-CM

## 2017-08-03 ENCOUNTER — APPOINTMENT (OUTPATIENT)
Dept: CARDIOLOGY | Facility: CLINIC | Age: 82
End: 2017-08-03
Payer: MEDICARE

## 2017-08-03 VITALS
SYSTOLIC BLOOD PRESSURE: 109 MMHG | OXYGEN SATURATION: 99 % | WEIGHT: 175 LBS | HEIGHT: 74 IN | BODY MASS INDEX: 22.46 KG/M2 | HEART RATE: 96 BPM | DIASTOLIC BLOOD PRESSURE: 65 MMHG

## 2017-08-03 PROCEDURE — 99214 OFFICE O/P EST MOD 30 MIN: CPT | Mod: 25

## 2017-08-03 PROCEDURE — 93000 ELECTROCARDIOGRAM COMPLETE: CPT

## 2017-08-03 RX ORDER — FLUOROURACIL 50 MG/G
5 CREAM TOPICAL
Qty: 40 | Refills: 0 | Status: DISCONTINUED | COMMUNITY
Start: 2017-04-06

## 2017-08-03 RX ORDER — DILTIAZEM HYDROCHLORIDE 240 MG/1
240 CAPSULE, EXTENDED RELEASE ORAL
Qty: 90 | Refills: 0 | Status: DISCONTINUED | COMMUNITY
Start: 2017-02-21

## 2017-08-08 ENCOUNTER — OUTPATIENT (OUTPATIENT)
Dept: OUTPATIENT SERVICES | Facility: HOSPITAL | Age: 82
LOS: 1 days | End: 2017-08-08
Payer: MEDICARE

## 2017-08-08 DIAGNOSIS — Z95.5 PRESENCE OF CORONARY ANGIOPLASTY IMPLANT AND GRAFT: Chronic | ICD-10-CM

## 2017-08-08 DIAGNOSIS — Z98.41 CATARACT EXTRACTION STATUS, RIGHT EYE: Chronic | ICD-10-CM

## 2017-08-08 DIAGNOSIS — Z98.890 OTHER SPECIFIED POSTPROCEDURAL STATES: Chronic | ICD-10-CM

## 2017-08-08 DIAGNOSIS — R06.09 OTHER FORMS OF DYSPNEA: ICD-10-CM

## 2017-08-08 DIAGNOSIS — Z98.89 OTHER SPECIFIED POSTPROCEDURAL STATES: Chronic | ICD-10-CM

## 2017-08-08 DIAGNOSIS — K08.409 PARTIAL LOSS OF TEETH, UNSPECIFIED CAUSE, UNSPECIFIED CLASS: Chronic | ICD-10-CM

## 2017-08-08 DIAGNOSIS — R07.9 CHEST PAIN, UNSPECIFIED: ICD-10-CM

## 2017-08-08 PROCEDURE — 78452 HT MUSCLE IMAGE SPECT MULT: CPT | Mod: 26

## 2017-08-08 PROCEDURE — 78452 HT MUSCLE IMAGE SPECT MULT: CPT

## 2017-08-08 PROCEDURE — 93016 CV STRESS TEST SUPVJ ONLY: CPT

## 2017-08-08 PROCEDURE — 93018 CV STRESS TEST I&R ONLY: CPT

## 2017-08-08 PROCEDURE — 93017 CV STRESS TEST TRACING ONLY: CPT

## 2017-08-08 PROCEDURE — A9500: CPT

## 2017-08-13 ENCOUNTER — NON-APPOINTMENT (OUTPATIENT)
Age: 82
End: 2017-08-13

## 2017-08-13 PROBLEM — R94.39 ABNORMAL NUCLEAR STRESS TEST: Status: ACTIVE | Noted: 2017-08-11

## 2017-08-18 ENCOUNTER — TRANSCRIPTION ENCOUNTER (OUTPATIENT)
Age: 82
End: 2017-08-18

## 2017-08-18 ENCOUNTER — OUTPATIENT (OUTPATIENT)
Dept: OUTPATIENT SERVICES | Facility: HOSPITAL | Age: 82
LOS: 1 days | Discharge: HOME | End: 2017-08-18
Payer: MEDICARE

## 2017-08-18 VITALS
RESPIRATION RATE: 20 BRPM | SYSTOLIC BLOOD PRESSURE: 124 MMHG | OXYGEN SATURATION: 98 % | TEMPERATURE: 98 F | DIASTOLIC BLOOD PRESSURE: 76 MMHG | HEART RATE: 111 BPM

## 2017-08-18 VITALS
HEART RATE: 122 BPM | OXYGEN SATURATION: 100 % | SYSTOLIC BLOOD PRESSURE: 118 MMHG | RESPIRATION RATE: 18 BRPM | DIASTOLIC BLOOD PRESSURE: 57 MMHG

## 2017-08-18 DIAGNOSIS — Z98.89 OTHER SPECIFIED POSTPROCEDURAL STATES: Chronic | ICD-10-CM

## 2017-08-18 DIAGNOSIS — Z95.5 PRESENCE OF CORONARY ANGIOPLASTY IMPLANT AND GRAFT: Chronic | ICD-10-CM

## 2017-08-18 DIAGNOSIS — I48.0 PAROXYSMAL ATRIAL FIBRILLATION: ICD-10-CM

## 2017-08-18 DIAGNOSIS — Z98.890 OTHER SPECIFIED POSTPROCEDURAL STATES: Chronic | ICD-10-CM

## 2017-08-18 DIAGNOSIS — K08.409 PARTIAL LOSS OF TEETH, UNSPECIFIED CAUSE, UNSPECIFIED CLASS: Chronic | ICD-10-CM

## 2017-08-18 DIAGNOSIS — I20.9 ANGINA PECTORIS, UNSPECIFIED: ICD-10-CM

## 2017-08-18 DIAGNOSIS — Z98.41 CATARACT EXTRACTION STATUS, RIGHT EYE: Chronic | ICD-10-CM

## 2017-08-18 DIAGNOSIS — R06.02 SHORTNESS OF BREATH: ICD-10-CM

## 2017-08-18 LAB
ALBUMIN SERPL ELPH-MCNC: 3.5 G/DL — SIGNIFICANT CHANGE UP (ref 3.3–5.2)
ALP SERPL-CCNC: 120 U/L — SIGNIFICANT CHANGE UP (ref 40–120)
ALT FLD-CCNC: 11 U/L — SIGNIFICANT CHANGE UP
ANION GAP SERPL CALC-SCNC: 13 MMOL/L — SIGNIFICANT CHANGE UP (ref 5–17)
APTT BLD: 25.6 SEC — LOW (ref 27.5–37.4)
AST SERPL-CCNC: 16 U/L — SIGNIFICANT CHANGE UP
BILIRUB SERPL-MCNC: 0.6 MG/DL — SIGNIFICANT CHANGE UP (ref 0.4–2)
BUN SERPL-MCNC: 20 MG/DL — SIGNIFICANT CHANGE UP (ref 8–20)
CALCIUM SERPL-MCNC: 9.4 MG/DL — SIGNIFICANT CHANGE UP (ref 8.6–10.2)
CHLORIDE SERPL-SCNC: 100 MMOL/L — SIGNIFICANT CHANGE UP (ref 98–107)
CO2 SERPL-SCNC: 28 MMOL/L — SIGNIFICANT CHANGE UP (ref 22–29)
CREAT SERPL-MCNC: 0.95 MG/DL — SIGNIFICANT CHANGE UP (ref 0.5–1.3)
GLUCOSE SERPL-MCNC: 123 MG/DL — HIGH (ref 70–115)
HCT VFR BLD CALC: 36.5 % — LOW (ref 42–52)
HGB BLD-MCNC: 11.8 G/DL — LOW (ref 14–18)
INR BLD: 1.08 RATIO — SIGNIFICANT CHANGE UP (ref 0.88–1.16)
MAGNESIUM SERPL-MCNC: 1.8 MG/DL — SIGNIFICANT CHANGE UP (ref 1.6–2.6)
MCHC RBC-ENTMCNC: 29.6 PG — SIGNIFICANT CHANGE UP (ref 27–31)
MCHC RBC-ENTMCNC: 32.3 G/DL — SIGNIFICANT CHANGE UP (ref 32–36)
MCV RBC AUTO: 91.5 FL — SIGNIFICANT CHANGE UP (ref 80–94)
PLATELET # BLD AUTO: 235 K/UL — SIGNIFICANT CHANGE UP (ref 150–400)
POTASSIUM SERPL-MCNC: 3.7 MMOL/L — SIGNIFICANT CHANGE UP (ref 3.5–5.3)
POTASSIUM SERPL-SCNC: 3.7 MMOL/L — SIGNIFICANT CHANGE UP (ref 3.5–5.3)
PROT SERPL-MCNC: 7 G/DL — SIGNIFICANT CHANGE UP (ref 6.6–8.7)
PROTHROM AB SERPL-ACNC: 11.9 SEC — SIGNIFICANT CHANGE UP (ref 9.8–12.7)
RBC # BLD: 3.99 M/UL — LOW (ref 4.6–6.2)
RBC # FLD: 15.5 % — SIGNIFICANT CHANGE UP (ref 11–15.6)
SODIUM SERPL-SCNC: 141 MMOL/L — SIGNIFICANT CHANGE UP (ref 135–145)
WBC # BLD: 7.5 K/UL — SIGNIFICANT CHANGE UP (ref 4.8–10.8)
WBC # FLD AUTO: 7.5 K/UL — SIGNIFICANT CHANGE UP (ref 4.8–10.8)

## 2017-08-18 PROCEDURE — 93460 R&L HRT ART/VENTRICLE ANGIO: CPT

## 2017-08-18 PROCEDURE — 83735 ASSAY OF MAGNESIUM: CPT

## 2017-08-18 PROCEDURE — 85610 PROTHROMBIN TIME: CPT

## 2017-08-18 PROCEDURE — 85730 THROMBOPLASTIN TIME PARTIAL: CPT

## 2017-08-18 PROCEDURE — 93010 ELECTROCARDIOGRAM REPORT: CPT

## 2017-08-18 PROCEDURE — C1887: CPT

## 2017-08-18 PROCEDURE — 85027 COMPLETE CBC AUTOMATED: CPT

## 2017-08-18 PROCEDURE — C1769: CPT

## 2017-08-18 PROCEDURE — 80053 COMPREHEN METABOLIC PANEL: CPT

## 2017-08-18 PROCEDURE — 93005 ELECTROCARDIOGRAM TRACING: CPT

## 2017-08-18 PROCEDURE — 93460 R&L HRT ART/VENTRICLE ANGIO: CPT | Mod: 26

## 2017-08-18 PROCEDURE — 36415 COLL VENOUS BLD VENIPUNCTURE: CPT

## 2017-08-18 PROCEDURE — C1894: CPT

## 2017-08-18 RX ORDER — POTASSIUM CHLORIDE 20 MEQ
40 PACKET (EA) ORAL ONCE
Qty: 0 | Refills: 0 | Status: COMPLETED | OUTPATIENT
Start: 2017-08-18 | End: 2017-08-18

## 2017-08-18 RX ORDER — POTASSIUM CHLORIDE 20 MEQ
1 PACKET (EA) ORAL
Qty: 60 | Refills: 2 | OUTPATIENT
Start: 2017-08-18

## 2017-08-18 RX ORDER — FUROSEMIDE 40 MG
40 TABLET ORAL
Qty: 0 | Refills: 0 | Status: DISCONTINUED | OUTPATIENT
Start: 2017-08-18 | End: 2017-09-02

## 2017-08-18 RX ORDER — CLOPIDOGREL BISULFATE 75 MG/1
300 TABLET, FILM COATED ORAL ONCE
Qty: 0 | Refills: 0 | Status: COMPLETED | OUTPATIENT
Start: 2017-08-18 | End: 2017-08-18

## 2017-08-18 RX ORDER — SODIUM CHLORIDE 9 MG/ML
1000 INJECTION INTRAMUSCULAR; INTRAVENOUS; SUBCUTANEOUS
Qty: 0 | Refills: 0 | Status: DISCONTINUED | OUTPATIENT
Start: 2017-08-18 | End: 2017-09-02

## 2017-08-18 RX ORDER — MAGNESIUM SULFATE 500 MG/ML
2 VIAL (ML) INJECTION ONCE
Qty: 0 | Refills: 0 | Status: COMPLETED | OUTPATIENT
Start: 2017-08-18 | End: 2017-08-18

## 2017-08-18 RX ORDER — FINASTERIDE 5 MG/1
1 TABLET, FILM COATED ORAL
Qty: 0 | Refills: 0 | COMMUNITY

## 2017-08-18 RX ORDER — FUROSEMIDE 40 MG
1 TABLET ORAL
Qty: 60 | Refills: 2 | OUTPATIENT
Start: 2017-08-18

## 2017-08-18 RX ADMIN — Medication 40 MILLIEQUIVALENT(S): at 11:25

## 2017-08-18 RX ADMIN — Medication 50 GRAM(S): at 11:25

## 2017-08-18 RX ADMIN — CLOPIDOGREL BISULFATE 300 MILLIGRAM(S): 75 TABLET, FILM COATED ORAL at 15:30

## 2017-08-18 NOTE — H&P PST ADULT - HISTORY OF PRESENT ILLNESS
86 yo male with h/o lung ca, kidney ca, melanoma, carotid disease, DVT, rheumatic heart disease, hypertension, hyperlipidemia, ex-smoker, DM, CAD with over 17 stents, CVA, GI bleed GSH 6/2017 no longer taking eliquis or aspirin.  Dr Fatima aware.  Patient presents today with c/o increasing SOB at rest, equivalent to CCS 4.  Mr Baeza is a patient of Dr Alcazar and Dr Mcelroy has recently had thoracentesis but states he remains SOB. Patient is on O2 at home.  Patient had adenosine stress test 8/8/17 shows small, mild to moderate defect in basal to mid inferolateral wall that is reversible suggestive of mild ischemia.  Echo 6/28/17 mild to moderate AI, no AS, mild TR, no PI mild pulmonary hypertension, mild MR EF 55%.  Present today for left and right heart cath with Dr Crespo.

## 2017-08-18 NOTE — DISCHARGE NOTE ADULT - NS AS ACTIVITY OBS
Showering allowed/Walking-Outdoors allowed/No Heavy lifting/straining/Walking-Indoors allowed/Do not make important decisions/Do not drive or operate machinery

## 2017-08-18 NOTE — DISCHARGE NOTE ADULT - PLAN OF CARE
ADL without SOB No heavy lifting, driving, sex, tub baths, swimming, or any activity that submerges the lower half of the body in water for 48 hours.  Limited walking and stairs for 48 hours.    Change the bandaid after 24 hours and every 24 hours after that.  Keep the puncture site dry and covered with a bandaid until a scab forms.    Observe the site frequently.  If bleeding or a large lump (the size of a golf ball or bigger) occurs lie flat, apply continuous direct pressure just above the puncture site for at least 10 minutes, and notify your physician immediately.  If the bleeding cannot be controlled, call 911 immediately for assistance.  Notify your physician of pain, swelling or any drainage.    Notify your physician immediately if coldness, numbness, discoloration or pain in your foot occurs.

## 2017-08-18 NOTE — DISCHARGE NOTE ADULT - CARE PLAN
Principal Discharge DX:	CAD (coronary artery disease)  Goal:	ADL without SOB  Instructions for follow-up, activity and diet:	No heavy lifting, driving, sex, tub baths, swimming, or any activity that submerges the lower half of the body in water for 48 hours.  Limited walking and stairs for 48 hours.    Change the bandaid after 24 hours and every 24 hours after that.  Keep the puncture site dry and covered with a bandaid until a scab forms.    Observe the site frequently.  If bleeding or a large lump (the size of a golf ball or bigger) occurs lie flat, apply continuous direct pressure just above the puncture site for at least 10 minutes, and notify your physician immediately.  If the bleeding cannot be controlled, call 911 immediately for assistance.  Notify your physician of pain, swelling or any drainage.    Notify your physician immediately if coldness, numbness, discoloration or pain in your foot occurs.

## 2017-08-18 NOTE — DISCHARGE NOTE ADULT - PATIENT PORTAL LINK FT
“You can access the FollowHealth Patient Portal, offered by [facility], by registering with the following website: [email or web address]”

## 2017-08-18 NOTE — DISCHARGE NOTE ADULT - MEDICATION SUMMARY - MEDICATIONS TO TAKE
I will START or STAY ON the medications listed below when I get home from the hospital:    plavix   -- 75 milligram(s) by mouth once a day  -- Indication: For Angina, class IV    finasteride 5 mg oral tablet  -- 1 tab(s) by mouth once a day  -- Indication: For BPH    Flomax 0.4 mg oral capsule  -- 1 cap(s) by mouth once a day  -- Indication: For BPH    Nitrostat 0.4 mg sublingual tablet  -- 1 tab(s) under tongue every 10 to 15 minutes, As Needed  -- Indication: For Angina, class IV    GlipiZIDE XL 5 mg oral tablet, extended release  -- 1 tab(s) by mouth 2 times a day  -- Indication: For DM    atorvastatin 40 mg oral tablet  -- 1 tab(s) by mouth once a day (at bedtime)  -- Indication: For Hyperlipidemia    metoprolol succinate  -- 75 milligram(s) by mouth once a day  -- Indication: For Hypertension    Tudorza Pressair 400 mcg/inh inhalation powder  -- 1 dose(s) inhaled 2 times a day  -- Indication: For COPD/CHF    furosemide 40 mg oral tablet  -- 1 tab(s) by mouth 2 times a day  -- Indication: For CHF    potassium chloride 20 mEq oral tablet, extended release  -- 1 tab(s) by mouth 2 times a day (with meals)  -- Indication: For Supplement    fluticasone 50 mcg/inh nasal spray  -- 1 spray(s) into nose once a day, As Needed - for allergy symptoms  -- Indication: For Allergies    pantoprazole 40 mg oral delayed release tablet  -- 1 tab(s) by mouth once a day  -- Indication: For GERD I will START or STAY ON the medications listed below when I get home from the hospital:    plavix   -- 75 milligram(s) by mouth once a day  -- Indication: For Angina, class IV    finasteride 5 mg oral tablet  -- 1 tab(s) by mouth once a day  -- Indication: For BPH    Aspirin Enteric Coated 81 mg oral delayed release tablet  -- 1 tab(s) by mouth once a day  -- Indication: For CAD (coronary artery disease)    Flomax 0.4 mg oral capsule  -- 1 cap(s) by mouth once a day  -- Indication: For BPH    Nitrostat 0.4 mg sublingual tablet  -- 1 tab(s) under tongue every 10 to 15 minutes, As Needed  -- Indication: For Angina, class IV    GlipiZIDE XL 5 mg oral tablet, extended release  -- 1 tab(s) by mouth 2 times a day  -- Indication: For DM    atorvastatin 40 mg oral tablet  -- 1 tab(s) by mouth once a day (at bedtime)  -- Indication: For Hyperlipidemia    metoprolol succinate  -- 75 milligram(s) by mouth once a day  -- Indication: For Hypertension    Tudorza Pressair 400 mcg/inh inhalation powder  -- 1 dose(s) inhaled 2 times a day  -- Indication: For COPD/CHF    furosemide 40 mg oral tablet  -- 1 tab(s) by mouth 2 times a day  -- Indication: For CHF    potassium chloride 20 mEq oral tablet, extended release  -- 1 tab(s) by mouth 2 times a day (with meals)  -- Indication: For Supplement    fluticasone 50 mcg/inh nasal spray  -- 1 spray(s) into nose once a day, As Needed - for allergy symptoms  -- Indication: For Allergies    pantoprazole 40 mg oral delayed release tablet  -- 1 tab(s) by mouth once a day  -- Indication: For GERD

## 2017-08-18 NOTE — ASU PATIENT PROFILE, ADULT - PMH
Atrial fibrillation    BPH (benign prostatic hyperplasia)    COPD (chronic obstructive pulmonary disease)    DM (diabetes mellitus)    DVT (deep venous thrombosis)    HLD (hyperlipidemia)    HTN (hypertension)    Kidney carcinoma    Lung cancer    Macular degeneration    Melanoma    Multiple falls    Parotid tumor  Right  Pleural effusion    Squamous cell skin cancer    TIA (transient ischemic attack)

## 2017-08-18 NOTE — DISCHARGE NOTE ADULT - CARE PROVIDER_API CALL
Lucio Fatima), Cardiovascular Disease  [address] [place] [address]  Phone: [phone]  Fax: [phone]

## 2017-08-18 NOTE — DISCHARGE NOTE ADULT - HOSPITAL COURSE
Elective cath showed Lmain disease.  Post procedure teaching done.  Return for PCI of Lmain with impella after plavix trial.  Plavix load today then 75 mg daily.

## 2017-08-19 RX ORDER — CLOPIDOGREL BISULFATE 75 MG/1
75 TABLET, FILM COATED ORAL
Qty: 2250 | Refills: 11 | OUTPATIENT
Start: 2017-08-19

## 2017-08-19 RX ORDER — ASPIRIN/CALCIUM CARB/MAGNESIUM 324 MG
1 TABLET ORAL
Qty: 0 | Refills: 0 | COMMUNITY
Start: 2017-08-19

## 2017-09-08 ENCOUNTER — INPATIENT (INPATIENT)
Facility: HOSPITAL | Age: 82
LOS: 0 days | Discharge: HOME | DRG: 247 | End: 2017-09-09
Attending: INTERNAL MEDICINE | Admitting: INTERNAL MEDICINE
Payer: COMMERCIAL

## 2017-09-08 ENCOUNTER — APPOINTMENT (OUTPATIENT)
Dept: HEMATOLOGY ONCOLOGY | Facility: CLINIC | Age: 82
End: 2017-09-08

## 2017-09-08 VITALS
OXYGEN SATURATION: 100 % | SYSTOLIC BLOOD PRESSURE: 114 MMHG | HEART RATE: 108 BPM | RESPIRATION RATE: 16 BRPM | DIASTOLIC BLOOD PRESSURE: 58 MMHG

## 2017-09-08 VITALS
OXYGEN SATURATION: 94 % | TEMPERATURE: 98 F | SYSTOLIC BLOOD PRESSURE: 127 MMHG | DIASTOLIC BLOOD PRESSURE: 78 MMHG | HEART RATE: 125 BPM | RESPIRATION RATE: 17 BRPM

## 2017-09-08 DIAGNOSIS — Z98.41 CATARACT EXTRACTION STATUS, RIGHT EYE: Chronic | ICD-10-CM

## 2017-09-08 DIAGNOSIS — Z98.89 OTHER SPECIFIED POSTPROCEDURAL STATES: Chronic | ICD-10-CM

## 2017-09-08 DIAGNOSIS — Z98.890 OTHER SPECIFIED POSTPROCEDURAL STATES: Chronic | ICD-10-CM

## 2017-09-08 DIAGNOSIS — R94.39 ABNORMAL RESULT OF OTHER CARDIOVASCULAR FUNCTION STUDY: ICD-10-CM

## 2017-09-08 DIAGNOSIS — K08.409 PARTIAL LOSS OF TEETH, UNSPECIFIED CAUSE, UNSPECIFIED CLASS: Chronic | ICD-10-CM

## 2017-09-08 DIAGNOSIS — Z95.5 PRESENCE OF CORONARY ANGIOPLASTY IMPLANT AND GRAFT: Chronic | ICD-10-CM

## 2017-09-08 DIAGNOSIS — I20.0 UNSTABLE ANGINA: ICD-10-CM

## 2017-09-08 LAB
ANION GAP SERPL CALC-SCNC: 15 MMOL/L — SIGNIFICANT CHANGE UP (ref 5–17)
BLD GP AB SCN SERPL QL: SIGNIFICANT CHANGE UP
BUN SERPL-MCNC: 27 MG/DL — HIGH (ref 8–20)
CALCIUM SERPL-MCNC: 10.1 MG/DL — SIGNIFICANT CHANGE UP (ref 8.6–10.2)
CHLORIDE SERPL-SCNC: 91 MMOL/L — LOW (ref 98–107)
CO2 SERPL-SCNC: 30 MMOL/L — HIGH (ref 22–29)
CREAT SERPL-MCNC: 1.08 MG/DL — SIGNIFICANT CHANGE UP (ref 0.5–1.3)
GLUCOSE SERPL-MCNC: 131 MG/DL — HIGH (ref 70–115)
HCT VFR BLD CALC: 37.3 % — LOW (ref 42–52)
HGB BLD-MCNC: 12.3 G/DL — LOW (ref 14–18)
INR BLD: 1.09 RATIO — SIGNIFICANT CHANGE UP (ref 0.88–1.16)
MCHC RBC-ENTMCNC: 29.7 PG — SIGNIFICANT CHANGE UP (ref 27–31)
MCHC RBC-ENTMCNC: 33 G/DL — SIGNIFICANT CHANGE UP (ref 32–36)
MCV RBC AUTO: 90.1 FL — SIGNIFICANT CHANGE UP (ref 80–94)
PLATELET # BLD AUTO: 255 K/UL — SIGNIFICANT CHANGE UP (ref 150–400)
POTASSIUM SERPL-MCNC: 4.2 MMOL/L — SIGNIFICANT CHANGE UP (ref 3.5–5.3)
POTASSIUM SERPL-SCNC: 4.2 MMOL/L — SIGNIFICANT CHANGE UP (ref 3.5–5.3)
PROTHROM AB SERPL-ACNC: 12 SEC — SIGNIFICANT CHANGE UP (ref 9.8–12.7)
RBC # BLD: 4.14 M/UL — LOW (ref 4.6–6.2)
RBC # FLD: 15.5 % — SIGNIFICANT CHANGE UP (ref 11–15.6)
SODIUM SERPL-SCNC: 136 MMOL/L — SIGNIFICANT CHANGE UP (ref 135–145)
TYPE + AB SCN PNL BLD: SIGNIFICANT CHANGE UP
WBC # BLD: 11.2 K/UL — HIGH (ref 4.8–10.8)
WBC # FLD AUTO: 11.2 K/UL — HIGH (ref 4.8–10.8)

## 2017-09-08 PROCEDURE — 93010 ELECTROCARDIOGRAM REPORT: CPT

## 2017-09-08 RX ORDER — ACLIDINIUM BROMIDE 400 UG/1
1 POWDER, METERED RESPIRATORY (INHALATION)
Qty: 0 | Refills: 0 | COMMUNITY

## 2017-09-08 RX ORDER — TAMSULOSIN HYDROCHLORIDE 0.4 MG/1
0.4 CAPSULE ORAL AT BEDTIME
Qty: 0 | Refills: 0 | Status: DISCONTINUED | OUTPATIENT
Start: 2017-09-08 | End: 2017-09-09

## 2017-09-08 RX ORDER — FUROSEMIDE 40 MG
40 TABLET ORAL
Qty: 0 | Refills: 0 | Status: DISCONTINUED | OUTPATIENT
Start: 2017-09-08 | End: 2017-09-09

## 2017-09-08 RX ORDER — METOPROLOL TARTRATE 50 MG
100 TABLET ORAL DAILY
Qty: 0 | Refills: 0 | Status: DISCONTINUED | OUTPATIENT
Start: 2017-09-08 | End: 2017-09-09

## 2017-09-08 RX ORDER — ATORVASTATIN CALCIUM 80 MG/1
40 TABLET, FILM COATED ORAL AT BEDTIME
Qty: 0 | Refills: 0 | Status: DISCONTINUED | OUTPATIENT
Start: 2017-09-08 | End: 2017-09-09

## 2017-09-08 RX ORDER — CLOPIDOGREL BISULFATE 75 MG/1
75 TABLET, FILM COATED ORAL DAILY
Qty: 0 | Refills: 0 | Status: DISCONTINUED | OUTPATIENT
Start: 2017-09-09 | End: 2017-09-09

## 2017-09-08 RX ORDER — FINASTERIDE 5 MG/1
1 TABLET, FILM COATED ORAL
Qty: 0 | Refills: 0 | COMMUNITY

## 2017-09-08 RX ORDER — TAMSULOSIN HYDROCHLORIDE 0.4 MG/1
1 CAPSULE ORAL
Qty: 0 | Refills: 0 | COMMUNITY

## 2017-09-08 RX ORDER — PANTOPRAZOLE SODIUM 20 MG/1
1 TABLET, DELAYED RELEASE ORAL
Qty: 0 | Refills: 0 | COMMUNITY

## 2017-09-08 RX ORDER — POTASSIUM CHLORIDE 20 MEQ
20 PACKET (EA) ORAL ONCE
Qty: 0 | Refills: 0 | Status: COMPLETED | OUTPATIENT
Start: 2017-09-08 | End: 2017-09-08

## 2017-09-08 RX ORDER — METOPROLOL TARTRATE 50 MG
75 TABLET ORAL
Qty: 0 | Refills: 0 | COMMUNITY

## 2017-09-08 RX ORDER — PANTOPRAZOLE SODIUM 20 MG/1
40 TABLET, DELAYED RELEASE ORAL
Qty: 0 | Refills: 0 | Status: DISCONTINUED | OUTPATIENT
Start: 2017-09-08 | End: 2017-09-09

## 2017-09-08 RX ORDER — CLOPIDOGREL BISULFATE 75 MG/1
75 TABLET, FILM COATED ORAL ONCE
Qty: 0 | Refills: 0 | Status: COMPLETED | OUTPATIENT
Start: 2017-09-08 | End: 2017-09-08

## 2017-09-08 RX ORDER — ASPIRIN/CALCIUM CARB/MAGNESIUM 324 MG
325 TABLET ORAL ONCE
Qty: 0 | Refills: 0 | Status: COMPLETED | OUTPATIENT
Start: 2017-09-08 | End: 2017-09-08

## 2017-09-08 RX ORDER — ASPIRIN/CALCIUM CARB/MAGNESIUM 324 MG
81 TABLET ORAL DAILY
Qty: 0 | Refills: 0 | Status: DISCONTINUED | OUTPATIENT
Start: 2017-09-09 | End: 2017-09-09

## 2017-09-08 RX ORDER — PANTOPRAZOLE SODIUM 20 MG/1
40 TABLET, DELAYED RELEASE ORAL ONCE
Qty: 0 | Refills: 0 | Status: COMPLETED | OUTPATIENT
Start: 2017-09-08 | End: 2017-09-08

## 2017-09-08 RX ORDER — FINASTERIDE 5 MG/1
5 TABLET, FILM COATED ORAL DAILY
Qty: 0 | Refills: 0 | Status: DISCONTINUED | OUTPATIENT
Start: 2017-09-08 | End: 2017-09-09

## 2017-09-08 RX ORDER — METOPROLOL TARTRATE 50 MG
5 TABLET ORAL ONCE
Qty: 0 | Refills: 0 | Status: COMPLETED | OUTPATIENT
Start: 2017-09-08 | End: 2017-09-08

## 2017-09-08 RX ADMIN — FINASTERIDE 5 MILLIGRAM(S): 5 TABLET, FILM COATED ORAL at 18:36

## 2017-09-08 RX ADMIN — ATORVASTATIN CALCIUM 40 MILLIGRAM(S): 80 TABLET, FILM COATED ORAL at 21:20

## 2017-09-08 RX ADMIN — PANTOPRAZOLE SODIUM 40 MILLIGRAM(S): 20 TABLET, DELAYED RELEASE ORAL at 10:30

## 2017-09-08 RX ADMIN — Medication 100 MILLIGRAM(S): at 18:37

## 2017-09-08 RX ADMIN — Medication 20 MILLIEQUIVALENT(S): at 18:37

## 2017-09-08 RX ADMIN — Medication 110 MILLIGRAM(S): at 12:20

## 2017-09-08 RX ADMIN — Medication 40 MILLIGRAM(S): at 18:37

## 2017-09-08 RX ADMIN — Medication 325 MILLIGRAM(S): at 10:30

## 2017-09-08 RX ADMIN — CLOPIDOGREL BISULFATE 75 MILLIGRAM(S): 75 TABLET, FILM COATED ORAL at 10:20

## 2017-09-08 RX ADMIN — TAMSULOSIN HYDROCHLORIDE 0.4 MILLIGRAM(S): 0.4 CAPSULE ORAL at 21:20

## 2017-09-08 NOTE — DISCHARGE NOTE ADULT - CARE PLAN
Principal Discharge DX:	H/O coronary angioplasty  Goal:	optmize cardiac health  Instructions for follow-up, activity and diet:	No heavy lifting, driving, sex, tub baths, swimming, or any activity that submerges the lower half of the body in water for 48 hours.  Limited walking and stairs for 48 hours.    Change the bandaid after 24 hours and every 24 hours after that.  Keep the puncture site dry and covered with a bandaid until a scab forms.    Observe the site frequently.  If bleeding or a large lump (the size of a golf ball or bigger) occurs lie flat, apply continuous direct pressure just above the puncture site for at least 10 minutes, and notify your physician immediately.  If the bleeding cannot be controlled, call 911 immediately for assistance.  Notify your physician of pain, swelling or any drainage.    Notify your physician immediately if coldness, numbness, discoloration or pain in your foot occurs.

## 2017-09-08 NOTE — DISCHARGE NOTE ADULT - PATIENT PORTAL LINK FT
“You can access the FollowHealth Patient Portal, offered by [facility], by registering with the following website: [email or web address]”

## 2017-09-08 NOTE — DISCHARGE NOTE ADULT - PLAN OF CARE
optmize [facility] No heavy lifting, driving, sex, tub baths, swimming, or any activity that submerges the lower half of the body in water for 48 hours.  Limited walking and stairs for 48 hours.    Change the bandaid after 24 hours and every 24 hours after that.  Keep the puncture site dry and covered with a bandaid until a scab forms.    Observe the site frequently.  If bleeding or a large lump (the size of a golf ball or bigger) occurs lie flat, apply continuous direct pressure just above the puncture site for at least 10 minutes, and notify your physician immediately.  If the bleeding cannot be controlled, call 911 immediately for assistance.  Notify your physician of pain, swelling or any drainage.    Notify your physician immediately if coldness, numbness, discoloration or pain in your foot occurs.

## 2017-09-08 NOTE — H&P PST ADULT - HISTORY OF PRESENT ILLNESS
86 yo male with signifcant PMHX of atrial fibrilation/flutter, on Eliquis resulting in GIB requiring  transfusions, CAD with Prior Coronary Stents, Renal Ca 84 yo male with signifcant PMHX of atrial fibrilation/flutter, on Eliquis resulting in GIB requiring  transfusions, CAD with Prior Coronary Stents, Renal Ca being monitored by [facility], Lungs Ca RLL resection in 2012, COPD----O2 Dependent, CHF, Parotid Tumor, Melanoma, squamous cell ca, HTN, HLD.  He was under the care of [facility] and recently switched to Dr Fatima for continued Dyspnea at rest and chest pain .  Recent Stress test high risk revealed  Anterior wall motion abnormality and LAD/LM severe ischemia territory.  EF 48%      He presents today for PCI of the LM with IMPELA.  with Dr Crespo.

## 2017-09-08 NOTE — H&P PST ADULT - ATTENDING COMMENTS
84 y/o male with significant LM disease, CCS III angina equivalent and abnormal stress test , not a surgical candidate due to comorbidities  for PCI of LM today

## 2017-09-08 NOTE — DISCHARGE NOTE ADULT - CARE PROVIDER_API CALL
Lucio Fatima), Cardiovascular Disease  [address] [place] [address]  Phone: [phone]  Fax: [phone]

## 2017-09-08 NOTE — PROGRESS NOTE ADULT - ASSESSMENT
Plan   1. bedrest for   4   hours  2. continue BB, Statin,   3. Antiplatelet theray to continue for one year minimum  4. follow up with cardiology 2 weeks post procedure   5. post produral care and instructions reviewed with the patient as well as questions answered.    6. plan for discharge home when stable   7. am labs and EKG

## 2017-09-08 NOTE — H&P PST ADULT - ASSESSMENT
Plan: PRE-PROCEDURE ASSESSMENT  Cardiac cath with possible intervention   -Patient seen and examined  -Labs reviewed  -Pre-procedure teaching completed with patient   -Questions answered about patients concerns    -plavix and aspirin given prior procedure   -pt is O2 dependent.     Case discussed with Dr Lee agrees with above plan of care.

## 2017-09-08 NOTE — PROGRESS NOTE ADULT - SUBJECTIVE AND OBJECTIVE BOX
Patient is a 85y old  Male who presents with a chief complaint of LM stent with IMPELA (08 Sep 2017 16:45)      HPI:  86 yo male with signifcant PMHX of atrial fibrilation/flutter, on Eliquis resulting in GIB requiring  transfusions, CAD with Prior Coronary Stents, Renal Ca being monitored by [facility], Lungs Ca RLL resection in 2012, COPD----O2 Dependent, CHF, Parotid Tumor, Melanoma, squamous cell ca, HTN, HLD.  He was under the care of [facility] ANGEL and recently switched to Dr Fatima for continued Dyspnea at rest and chest pain .  Recent Stress test high risk revealed  Anterior wall motion abnormality and LAD/LM severe ischemia territory.  EF 48%        He is Post PCI to the RCA     PAST MEDICAL & SURGICAL HISTORY:  Atrial fibrillation  Macular degeneration  DVT (deep venous thrombosis)  TIA (transient ischemic attack)  BPH (benign prostatic hyperplasia)  Pleural effusion  Multiple falls  COPD (chronic obstructive pulmonary disease)  Parotid tumor: Right  Squamous cell skin cancer  Melanoma  HLD (hyperlipidemia)  DM (diabetes mellitus)  HTN (hypertension)  Kidney carcinoma  Lung cancer  History of knee surgery  History of bilateral cataract extraction  H/O tooth extraction  H/O shoulder surgery  S/P skin cancer resection  History of thoracic surgery: VATS  Presence of stent in coronary artery: x 17      PREVIOUS DIAGNOSTIC TESTING:        Allergies    Pradaxa (Other)    Intolerances    Eliquis (Other)      MEDICATIONS  (STANDING):  atorvastatin 40 milliGRAM(s) Oral at bedtime  finasteride 5 milliGRAM(s) Oral daily  tamsulosin 0.4 milliGRAM(s) Oral at bedtime  metoprolol succinate  milliGRAM(s) Oral daily  furosemide    Tablet 40 milliGRAM(s) Oral two times a day  potassium chloride    Tablet ER 20 milliEquivalent(s) Oral once  pantoprazole    Tablet 40 milliGRAM(s) Oral before breakfast    MEDICATIONS  (PRN):    	    Vital Signs Last 24 Hrs  T(C): 36.5 (08 Sep 2017 10:45), Max: 36.5 (08 Sep 2017 10:45)  T(F): 97.7 (08 Sep 2017 10:45), Max: 97.7 (08 Sep 2017 10:45)  HR: 106 (08 Sep 2017 16:40) (92 - 125)  BP: 126/60 (08 Sep 2017 16:40) (94/59 - 127/78)  BP(mean): --  RR: 16 (08 Sep 2017 16:40) (15 - 18)  SpO2: 100% (08 Sep 2017 16:40) (94% - 100%)    I&O's Detail      PHYSICAL EXAM:  Appearance: Normal, well nourished	  HEENT:   Normal oral mucosa, PERRL, EOMI, sclera non-icteric	  Lymphatic: No cervical lymphadenopathy  Cardiovascular: Normal S1 S2, No JVD, No cardiac murmurs, No carotid bruits, No peripheral edema  Respiratory: Lungs clear to auscultation	  Psychiatry: A & O x 3, Mood & affect appropriate  Gastrointestinal:  Soft, Non-tender, + BS, no bruits	  Skin: No rashes, No ecchymoses, No cyanosis  Neurologic: Grossly non-focal with full strength in all four extremities  Extremities: Normal range of motion, No clubbing, cyanosis or edema  Vascular: Peripheral pulses palpable 2+ bilaterally      INTERPRETATION OF TELEMETRY:    ECG: afib no acute change     LABS:                        12.3   11.2  )-----------( 255      ( 08 Sep 2017 10:34 )             37.3     09-08    136  |  91<L>  |  27.0<H>  ----------------------------<  131<H>  4.2   |  30.0<H>  |  1.08    Ca    10.1      08 Sep 2017 10:34          PT/INR - ( 08 Sep 2017 10:34 )   PT: 12.0 sec;   INR: 1.09 ratio             I&O's Summary    BNP

## 2017-09-08 NOTE — DISCHARGE NOTE ADULT - HOSPITAL COURSE
86 y/o male with significant LM disease, CCS III angina equivalent and abnormal stress test , not a surgical candidate due to comorbidities  for PCI of LM today.  S/P PCI to the RCA .   Increased BB to 100mg po daily .

## 2017-09-09 LAB
ANION GAP SERPL CALC-SCNC: 12 MMOL/L — SIGNIFICANT CHANGE UP (ref 5–17)
BUN SERPL-MCNC: 28 MG/DL — HIGH (ref 8–20)
CALCIUM SERPL-MCNC: 9.7 MG/DL — SIGNIFICANT CHANGE UP (ref 8.6–10.2)
CHLORIDE SERPL-SCNC: 96 MMOL/L — LOW (ref 98–107)
CO2 SERPL-SCNC: 31 MMOL/L — HIGH (ref 22–29)
CREAT SERPL-MCNC: 1.07 MG/DL — SIGNIFICANT CHANGE UP (ref 0.5–1.3)
GLUCOSE SERPL-MCNC: 167 MG/DL — HIGH (ref 70–115)
HCT VFR BLD CALC: 36.1 % — LOW (ref 42–52)
HGB BLD-MCNC: 11.6 G/DL — LOW (ref 14–18)
MAGNESIUM SERPL-MCNC: 2.1 MG/DL — SIGNIFICANT CHANGE UP (ref 1.8–2.6)
MCHC RBC-ENTMCNC: 29.1 PG — SIGNIFICANT CHANGE UP (ref 27–31)
MCHC RBC-ENTMCNC: 32.1 G/DL — SIGNIFICANT CHANGE UP (ref 32–36)
MCV RBC AUTO: 90.5 FL — SIGNIFICANT CHANGE UP (ref 80–94)
PLATELET # BLD AUTO: 222 K/UL — SIGNIFICANT CHANGE UP (ref 150–400)
POTASSIUM SERPL-MCNC: 4.3 MMOL/L — SIGNIFICANT CHANGE UP (ref 3.5–5.3)
POTASSIUM SERPL-SCNC: 4.3 MMOL/L — SIGNIFICANT CHANGE UP (ref 3.5–5.3)
RBC # BLD: 3.99 M/UL — LOW (ref 4.6–6.2)
RBC # FLD: 15.7 % — HIGH (ref 11–15.6)
SODIUM SERPL-SCNC: 139 MMOL/L — SIGNIFICANT CHANGE UP (ref 135–145)
WBC # BLD: 8.9 K/UL — SIGNIFICANT CHANGE UP (ref 4.8–10.8)
WBC # FLD AUTO: 8.9 K/UL — SIGNIFICANT CHANGE UP (ref 4.8–10.8)

## 2017-09-09 PROCEDURE — C1874: CPT

## 2017-09-09 PROCEDURE — C1753: CPT

## 2017-09-09 PROCEDURE — 93005 ELECTROCARDIOGRAM TRACING: CPT

## 2017-09-09 PROCEDURE — C1769: CPT

## 2017-09-09 PROCEDURE — 80048 BASIC METABOLIC PNL TOTAL CA: CPT

## 2017-09-09 PROCEDURE — 93010 ELECTROCARDIOGRAM REPORT: CPT

## 2017-09-09 PROCEDURE — 93571 IV DOP VEL&/PRESS C FLO 1ST: CPT

## 2017-09-09 PROCEDURE — 85027 COMPLETE CBC AUTOMATED: CPT

## 2017-09-09 PROCEDURE — 85610 PROTHROMBIN TIME: CPT

## 2017-09-09 PROCEDURE — 92978 ENDOLUMINL IVUS OCT C 1ST: CPT

## 2017-09-09 PROCEDURE — 86900 BLOOD TYPING SEROLOGIC ABO: CPT

## 2017-09-09 PROCEDURE — 36415 COLL VENOUS BLD VENIPUNCTURE: CPT

## 2017-09-09 PROCEDURE — 83735 ASSAY OF MAGNESIUM: CPT

## 2017-09-09 PROCEDURE — 86850 RBC ANTIBODY SCREEN: CPT

## 2017-09-09 PROCEDURE — C1887: CPT

## 2017-09-09 PROCEDURE — C9600: CPT

## 2017-09-09 PROCEDURE — 86901 BLOOD TYPING SEROLOGIC RH(D): CPT

## 2017-09-09 PROCEDURE — C1894: CPT

## 2017-09-18 ENCOUNTER — APPOINTMENT (OUTPATIENT)
Dept: CARDIOLOGY | Facility: CLINIC | Age: 82
End: 2017-09-18
Payer: MEDICARE

## 2017-09-18 ENCOUNTER — NON-APPOINTMENT (OUTPATIENT)
Age: 82
End: 2017-09-18

## 2017-09-18 VITALS
SYSTOLIC BLOOD PRESSURE: 99 MMHG | HEIGHT: 74 IN | HEART RATE: 113 BPM | OXYGEN SATURATION: 98 % | BODY MASS INDEX: 20.79 KG/M2 | DIASTOLIC BLOOD PRESSURE: 61 MMHG | WEIGHT: 162 LBS

## 2017-09-18 VITALS — DIASTOLIC BLOOD PRESSURE: 73 MMHG | SYSTOLIC BLOOD PRESSURE: 117 MMHG

## 2017-09-18 DIAGNOSIS — Z95.5 PRESENCE OF CORONARY ANGIOPLASTY IMPLANT AND GRAFT: ICD-10-CM

## 2017-09-18 DIAGNOSIS — N40.0 BENIGN PROSTATIC HYPERPLASIA WITHOUT LOWER URINARY TRACT SYMPMS: ICD-10-CM

## 2017-09-18 PROCEDURE — 93000 ELECTROCARDIOGRAM COMPLETE: CPT

## 2017-09-18 PROCEDURE — 99214 OFFICE O/P EST MOD 30 MIN: CPT

## 2017-09-18 RX ORDER — ASPIRIN ENTERIC COATED TABLETS 81 MG 81 MG/1
81 TABLET, DELAYED RELEASE ORAL
Qty: 90 | Refills: 0 | Status: ACTIVE | COMMUNITY
Start: 2017-09-18

## 2017-09-18 RX ORDER — CLOPIDOGREL 75 MG/1
75 TABLET, FILM COATED ORAL DAILY
Qty: 1 | Refills: 1 | Status: ACTIVE | COMMUNITY
Start: 2017-09-18

## 2017-10-06 ENCOUNTER — APPOINTMENT (OUTPATIENT)
Dept: CARDIOLOGY | Facility: CLINIC | Age: 82
End: 2017-10-06
Payer: MEDICARE

## 2017-10-06 VITALS
DIASTOLIC BLOOD PRESSURE: 53 MMHG | SYSTOLIC BLOOD PRESSURE: 87 MMHG | HEART RATE: 119 BPM | HEIGHT: 74 IN | OXYGEN SATURATION: 97 %

## 2017-10-06 VITALS — DIASTOLIC BLOOD PRESSURE: 69 MMHG | SYSTOLIC BLOOD PRESSURE: 116 MMHG

## 2017-10-06 DIAGNOSIS — I48.91 UNSPECIFIED ATRIAL FIBRILLATION: ICD-10-CM

## 2017-10-06 DIAGNOSIS — R07.9 CHEST PAIN, UNSPECIFIED: ICD-10-CM

## 2017-10-06 DIAGNOSIS — I25.10 ATHEROSCLEROTIC HEART DISEASE OF NATIVE CORONARY ARTERY W/OUT ANGINA PECTORIS: ICD-10-CM

## 2017-10-06 DIAGNOSIS — R06.09 OTHER FORMS OF DYSPNEA: ICD-10-CM

## 2017-10-06 DIAGNOSIS — I50.9 HEART FAILURE, UNSPECIFIED: ICD-10-CM

## 2017-10-06 PROCEDURE — 93000 ELECTROCARDIOGRAM COMPLETE: CPT

## 2017-10-06 PROCEDURE — 99214 OFFICE O/P EST MOD 30 MIN: CPT

## 2017-10-06 RX ORDER — METOPROLOL SUCCINATE 100 MG/1
100 TABLET, EXTENDED RELEASE ORAL
Qty: 30 | Refills: 0 | Status: DISCONTINUED | COMMUNITY
Start: 2017-09-09

## 2017-10-06 RX ORDER — DIGOXIN 125 UG/1
125 TABLET ORAL DAILY
Qty: 30 | Refills: 3 | Status: ACTIVE | COMMUNITY
Start: 2017-10-06 | End: 1900-01-01

## 2017-10-13 ENCOUNTER — APPOINTMENT (OUTPATIENT)
Dept: PULMONOLOGY | Facility: CLINIC | Age: 82
End: 2017-10-13
Payer: MEDICARE

## 2017-10-13 VITALS
BODY MASS INDEX: 21.44 KG/M2 | DIASTOLIC BLOOD PRESSURE: 72 MMHG | HEART RATE: 109 BPM | OXYGEN SATURATION: 93 % | WEIGHT: 167 LBS | SYSTOLIC BLOOD PRESSURE: 116 MMHG

## 2017-10-13 DIAGNOSIS — R06.02 SHORTNESS OF BREATH: ICD-10-CM

## 2017-10-13 DIAGNOSIS — J90 PLEURAL EFFUSION, NOT ELSEWHERE CLASSIFIED: ICD-10-CM

## 2017-10-13 PROCEDURE — 99215 OFFICE O/P EST HI 40 MIN: CPT

## 2017-10-15 PROBLEM — I50.9 CHF (CONGESTIVE HEART FAILURE): Status: ACTIVE | Noted: 2017-05-11

## 2017-10-15 PROBLEM — R07.9 CHEST PAIN: Status: ACTIVE | Noted: 2017-07-06

## 2017-10-20 ENCOUNTER — NON-APPOINTMENT (OUTPATIENT)
Age: 82
End: 2017-10-20

## 2017-10-24 ENCOUNTER — RESULT REVIEW (OUTPATIENT)
Age: 82
End: 2017-10-24

## 2017-10-24 LAB
CHOLEST SERPL-MCNC: 106 MG/DL
CHOLEST/HDLC SERPL: 2.7 RATIO
DIGOXIN SERPL-MCNC: 1.1 NG/ML
HDLC SERPL-MCNC: 39 MG/DL
LDLC SERPL CALC-MCNC: 51 MG/DL
TRIGL SERPL-MCNC: 81 MG/DL

## 2017-11-14 ENCOUNTER — APPOINTMENT (OUTPATIENT)
Dept: CARDIOLOGY | Facility: CLINIC | Age: 82
End: 2017-11-14

## 2018-01-20 NOTE — H&P PST ADULT - PHYSICIAN'S ASSESSMENT OF RISK
Face to face end of shift report received from DEXTER Burrows. Rounding completed. Patient observed.     Aranza Tyler  1/20/2018  5:05 PM         High Risk

## 2018-02-06 ENCOUNTER — APPOINTMENT (OUTPATIENT)
Dept: PULMONOLOGY | Facility: CLINIC | Age: 83
End: 2018-02-06

## 2018-07-26 PROBLEM — Z86.73 HISTORY OF STROKE: Status: RESOLVED | Noted: 2017-05-11 | Resolved: 2018-07-26

## 2021-11-16 NOTE — H&P PST ADULT - NS SC CAGE ALCOHOL EYE OPENER
DM Type II without sign of diabetic retinopathy and no blot heme on dilated retinal examination today OU No Macular Edema:  Discussed the pathophysiology of diabetes and its effect on the eye and risk of blindness. Stressed the importance of strong glucose control. Advised of importance of at least yearly dilated examinations but to contact us immediately for any problems or concerns. no

## 2023-11-10 NOTE — ASU PATIENT PROFILE, ADULT - MEDICATION HERBAL REMEDIES, PROFILE
[facility]  ED to EMPATH Checklist:      Goal for EMPATH: Anxiety management    Current Behavior: Sad    Safety Concerns: None    Legal Hold Status: Voluntary    Medically Cleared by ED provider: Yes    Patient Therapeutically Searched: Therapeutic search by ED staff (strings, belts, shoes, pockets, electronics, etc.)    Belongings: Remain with patient    Independent Ambulation at Baseline: Yes/No: Yes    Participates in Care/Conversation: Yes/No: Yes    Patient Informed about EMPATH: Yes/No: Yes    DEC: Ordered and pending    Patient Ready to be Transferred to EMPATH? Yes/No: Yes      
no

## 2025-04-03 NOTE — ASU PATIENT PROFILE, ADULT - FALL HARM RISK CONCLUSION
Please continue low-fat diet    Strongly advise to stop alcohol    If You develop fever, worsening abdominal pain, nausea or vomiting-please call primary care doctor or come to the emergency room.       Fall Risk